# Patient Record
Sex: FEMALE | Race: ASIAN | URBAN - METROPOLITAN AREA
[De-identification: names, ages, dates, MRNs, and addresses within clinical notes are randomized per-mention and may not be internally consistent; named-entity substitution may affect disease eponyms.]

---

## 2024-10-21 ENCOUNTER — OFFICE VISIT (OUTPATIENT)
Age: 69
End: 2024-10-21

## 2024-10-21 VITALS
WEIGHT: 110 LBS | RESPIRATION RATE: 18 BRPM | OXYGEN SATURATION: 98 % | DIASTOLIC BLOOD PRESSURE: 77 MMHG | SYSTOLIC BLOOD PRESSURE: 121 MMHG | TEMPERATURE: 97.7 F | HEIGHT: 62 IN | BODY MASS INDEX: 20.24 KG/M2 | HEART RATE: 73 BPM

## 2024-10-21 DIAGNOSIS — S92.511A CLOSED DISPLACED FRACTURE OF PROXIMAL PHALANX OF LESSER TOE OF RIGHT FOOT, INITIAL ENCOUNTER: ICD-10-CM

## 2024-10-21 DIAGNOSIS — M79.674 TOE PAIN, RIGHT: Primary | ICD-10-CM

## 2024-10-21 RX ORDER — IBUPROFEN 200 MG
400 TABLET ORAL ONCE
Status: COMPLETED | OUTPATIENT
Start: 2024-10-21 | End: 2024-10-21

## 2024-10-21 RX ORDER — LEVOTHYROXINE SODIUM 75 UG/1
75 TABLET ORAL DAILY
COMMUNITY

## 2024-10-21 RX ORDER — LOSARTAN POTASSIUM 100 MG/1
100 TABLET ORAL DAILY
COMMUNITY

## 2024-10-21 RX ORDER — ATORVASTATIN CALCIUM 20 MG/1
20 TABLET, FILM COATED ORAL DAILY
COMMUNITY

## 2024-10-21 RX ORDER — IBUPROFEN 600 MG/1
600 TABLET, FILM COATED ORAL 3 TIMES DAILY PRN
Qty: 30 TABLET | Refills: 0 | Status: SHIPPED | OUTPATIENT
Start: 2024-10-21 | End: 2024-10-21 | Stop reason: ALTCHOICE

## 2024-10-21 RX ORDER — MIRTAZAPINE 15 MG/1
15 TABLET, FILM COATED ORAL NIGHTLY
COMMUNITY

## 2024-10-21 RX ADMIN — Medication 400 MG: at 18:56

## 2024-10-21 NOTE — PATIENT INSTRUCTIONS
Please follow up with Orthopedics for further evaluation and management     You may continue to buddy tape your affect toe and wear the post op shoe to help manage discomfort when walking      Rest and elevate the affected painful area.  Apply cold compresses intermittently as needed.  As pain recedes, begin normal activities slowly as tolerated.    If you develop any worsening in symptoms, increase in pain, changes in sensation please seek further evaluation in the emergency department

## 2024-10-21 NOTE — PROGRESS NOTES
10/21/2024   Scott Pacheco (: 1955) is a 69 y.o. female, New patient, here for evaluation of the following chief complaint(s):  Injury (Stubbed pinky toe. On right foot. Pt. Has history of osteopenia. )     ASSESSMENT/PLAN:  Below is the assessment and plan developed based on review of pertinent history, physical exam, labs, studies, and medications.  1. Toe pain, right  -     XR TOE RIGHT (MIN 2 VIEWS); Future  -     ibuprofen (ADVIL;MOTRIN) tablet 400 mg; 400 mg, Oral, ONCE, 1 dose, On Mon 10/21/24 at [address] [place] [address] - Dorota Liu MD, Orthopedic Surgery (foot, ankle), [place] ([facility] [address])  2. Closed displaced fracture of proximal phalanx of lesser toe of right foot, initial encounter  -     [facility] Post Op Shoe, Unisex - Right; MD (M7.5-9/F8.5-10)    The patient presents post trauma for evaluation of injuries.  Based on physical exam and X-ray evaluation, there is an apparent fracture.  Patient will be treated with buddy tape and post op shoe. They have agreed to appropriate orthopaedic follow up within one week for definitive fracture care.       Handout given with care instructions  2. OTC for symptom management. Increase fluid intake, ensure adequate nutritional intake.  3. Follow up with PCP as needed.  4. Go to ED with development of any acute symptoms.     Follow up:  Return in about 5 days (around 10/26/2024), or if symptoms worsen or fail to improve.  Follow up immediately for any new, worsening or changes or if symptoms are not improving over the next 5-7 days.     SUBJECTIVE/OBJECTIVE:  69 y.o. female presents with concern for right fifth toe pain, swelling, limited movement after hitting it on the edge of a couch 6 days ago.  She has tried taking Tylenol once daily for her symptoms.  She is able to bear weight to right foot, but reports discomfort      Injury         There are no diagnoses linked to this encounter.    Injury (Stubbed pinky toe. On right foot. Pt.

## 2024-10-30 PROBLEM — S92.514A CLOSED NONDISPLACED FRACTURE OF PROXIMAL PHALANX OF LESSER TOE OF RIGHT FOOT: Status: ACTIVE | Noted: 2024-10-30

## 2024-10-30 PROBLEM — S92.514A CLOSED NONDISPLACED FRACTURE OF PROXIMAL PHALANX OF LESSER TOE OF RIGHT FOOT: Status: RESOLVED | Noted: 2024-10-30 | Resolved: 2024-10-30

## 2025-08-24 ENCOUNTER — OFFICE VISIT (OUTPATIENT)
Age: 70
End: 2025-08-24

## 2025-08-24 VITALS
BODY MASS INDEX: 19.32 KG/M2 | WEIGHT: 105 LBS | DIASTOLIC BLOOD PRESSURE: 64 MMHG | RESPIRATION RATE: 16 BRPM | HEART RATE: 77 BPM | HEIGHT: 62 IN | OXYGEN SATURATION: 95 % | TEMPERATURE: 98.4 F | SYSTOLIC BLOOD PRESSURE: 103 MMHG

## 2025-08-24 DIAGNOSIS — S99.921A INJURY OF SMALL TOE, RIGHT, INITIAL ENCOUNTER: Primary | ICD-10-CM
